# Patient Record
Sex: MALE | Race: WHITE | Employment: OTHER | URBAN - METROPOLITAN AREA
[De-identification: names, ages, dates, MRNs, and addresses within clinical notes are randomized per-mention and may not be internally consistent; named-entity substitution may affect disease eponyms.]

---

## 2020-01-10 ENCOUNTER — TRANSFERRED RECORDS (OUTPATIENT)
Dept: HEALTH INFORMATION MANAGEMENT | Facility: CLINIC | Age: 45
End: 2020-01-10

## 2020-01-10 ENCOUNTER — HOSPITAL ENCOUNTER (INPATIENT)
Facility: HOSPITAL | Age: 45
LOS: 4 days | Discharge: HOME | End: 2020-01-14
Attending: PSYCHIATRY & NEUROLOGY | Admitting: PSYCHIATRY & NEUROLOGY
Payer: MEDICAID

## 2020-01-10 DIAGNOSIS — F41.1 GENERALIZED ANXIETY DISORDER: Primary | ICD-10-CM

## 2020-01-10 PROBLEM — F33.1 MAJOR DEPRESSIVE DISORDER, RECURRENT EPISODE, MODERATE (H): Status: ACTIVE | Noted: 2020-01-10

## 2020-01-10 PROBLEM — F32.A DEPRESSION WITH SUICIDAL IDEATION: Status: ACTIVE | Noted: 2020-01-10

## 2020-01-10 PROBLEM — R45.851 DEPRESSION WITH SUICIDAL IDEATION: Status: ACTIVE | Noted: 2020-01-10

## 2020-01-10 LAB
ALT SERPL-CCNC: 44 IU/L (ref 6–40)
AST SERPL-CCNC: 39 IU/L (ref 10–40)
CREAT SERPL-MCNC: 0.86 MG/DL (ref 0.7–1.2)
GFR SERPL CREATININE-BSD FRML MDRD: >60 ML/MIN/1.73M*2
GLUCOSE SERPL-MCNC: 99 MG/DL (ref 70–99)
POTASSIUM SERPL-SCNC: 4.3 MEQ/L (ref 3.4–5.1)

## 2020-01-10 PROCEDURE — 12400000 ZZH R&B MH

## 2020-01-10 RX ORDER — OLANZAPINE 10 MG/2ML
10 INJECTION, POWDER, FOR SOLUTION INTRAMUSCULAR 2 TIMES DAILY PRN
Status: DISCONTINUED | OUTPATIENT
Start: 2020-01-10 | End: 2020-01-14 | Stop reason: HOSPADM

## 2020-01-10 RX ORDER — ALUMINA, MAGNESIA, AND SIMETHICONE 2400; 2400; 240 MG/30ML; MG/30ML; MG/30ML
30 SUSPENSION ORAL EVERY 4 HOURS PRN
Status: DISCONTINUED | OUTPATIENT
Start: 2020-01-10 | End: 2020-01-14 | Stop reason: HOSPADM

## 2020-01-10 RX ORDER — TRAZODONE HYDROCHLORIDE 50 MG/1
50 TABLET, FILM COATED ORAL
Status: DISCONTINUED | OUTPATIENT
Start: 2020-01-10 | End: 2020-01-14 | Stop reason: HOSPADM

## 2020-01-10 RX ORDER — ACETAMINOPHEN 325 MG/1
650 TABLET ORAL EVERY 4 HOURS PRN
Status: DISCONTINUED | OUTPATIENT
Start: 2020-01-10 | End: 2020-01-14 | Stop reason: HOSPADM

## 2020-01-10 RX ORDER — HYDROXYZINE HYDROCHLORIDE 25 MG/1
25-50 TABLET, FILM COATED ORAL EVERY 4 HOURS PRN
Status: DISCONTINUED | OUTPATIENT
Start: 2020-01-10 | End: 2020-01-14 | Stop reason: HOSPADM

## 2020-01-10 RX ORDER — OLANZAPINE 10 MG/1
10 TABLET ORAL 2 TIMES DAILY PRN
Status: DISCONTINUED | OUTPATIENT
Start: 2020-01-10 | End: 2020-01-14 | Stop reason: HOSPADM

## 2020-01-10 RX ORDER — LORAZEPAM 1 MG/1
1-2 TABLET ORAL 2 TIMES DAILY PRN
Status: DISCONTINUED | OUTPATIENT
Start: 2020-01-10 | End: 2020-01-14 | Stop reason: HOSPADM

## 2020-01-10 ASSESSMENT — ACTIVITIES OF DAILY LIVING (ADL)
HYGIENE/GROOMING: INDEPENDENT
TOILETING: 0-->INDEPENDENT
BATHING: 0-->INDEPENDENT
RETIRED_COMMUNICATION: 0-->UNDERSTANDS/COMMUNICATES WITHOUT DIFFICULTY
AMBULATION: 0-->INDEPENDENT
COGNITION: 0 - NO COGNITION ISSUES REPORTED
DRESS: 0-->INDEPENDENT
TRANSFERRING: 0-->INDEPENDENT
SWALLOWING: 0-->SWALLOWS FOODS/LIQUIDS WITHOUT DIFFICULTY
ORAL_HYGIENE: INDEPENDENT
RETIRED_EATING: 0-->INDEPENDENT
DRESS: SCRUBS (BEHAVIORAL HEALTH);INDEPENDENT
FALL_HISTORY_WITHIN_LAST_SIX_MONTHS: NO

## 2020-01-10 ASSESSMENT — MIFFLIN-ST. JEOR: SCORE: 1719.1

## 2020-01-10 NOTE — H&P
"[facility]    History and Physical  Medical Services       Date of Admission:  1/10/2020  Date of Service (when I saw the patient): 01/10/20    Assessment & Plan     Principal Problem:    Depression with suicidal ideation  Active Problems:    Alcohol use disorder, severe, in sustained remission (H)    Stimulant use disorder    Antisocial personality disorder (H)    Bipolar I disorder, most recent episode (or current) manic, moderate (H)    Cannabis use disorder, severe, in sustained remission (H)    Reflux esophagitis    Tobacco use disorder      Code Status: Full Code    Bessie Eason CNP    Primary Care Physician   Physician No Ref-Primary    Chief Complaint     \"suicidal\"     History obtained from chart.     History of Present Illness        Jeffry Mensah is a 44 year old with suicidal ideation with plan to hang self and increased depression. Stated \"I'm feeling suicidal. I'm not good\" \"I've been sad for a long time, they don't help you in [facility]\". Patient has a history of antisocial personality disorder, depression, adjustment disorder, and chemical dependency. The patient reported he just got out of [facility] on Monday 1/6/20 and has had a number of stressors in his life including being homeless. He reported he came from the [facility] planning to live with his brother although was not able to do so because his brother is involved in illegal activities. He also stated he feels very stressed by being on his own and trying to start his life without being involved in illegal activities or illegal drug use. He reported he had particular thoughts of hanging himself prompting him to come in for evaluation. In addition he reported one prior suicide attempt by cutting in the past and prior psychiatric hospitalizations. He denies any recent alcohol or illegal drug use. He does report he met with a county  to get some resources in place including a job and mental health follow-up although has " not had that appointment at this point. Stated trazodone helps, but does not take any other medications. Patient was transferred to [facility] and voluntarily admitted to Inpatient Behavioral Health for further assessment and stabilization.        Past Medical History    I have reviewed this patient's medical history and updated it with pertinent information if needed.   Past Medical History:   Diagnosis Date     Adjustment disorder with depressed mood 01/28/2015     Alcohol use disorder, severe, in sustained remission (H) 3/3/2014     Antisocial personality disorder (H) 2/26/2014     Antisocial personality disorder in adult (H)      Bipolar I disorder, most recent episode (or current) manic, moderate (H) 8/27/2015     Cannabis use disorder, severe, in sustained remission (H) 3/3/2014    Updated per 10/1/17 IMO import     Depression with suicidal ideation 1/10/2020     Hiatal hernia 02/14/2014     Insomnia 9/29/2009     Major depressive disorder, recurrent episode, moderate (H) 1/10/2020     Polysubstance dependence (H) 9/29/2009    Hx of using EtOH, Methamphetamine, marijuana, LSD plus 'others';  Last reported use Aug 7th, 09;  [facility].     Reflux esophagitis 02/14/2014     Stimulant use disorder 3/3/2014     Substance-induced psychotic disorder with hallucinations (H) 01/28/2015     Tobacco use disorder 8/27/2015       Past Surgical History   I have reviewed this patient's surgical history and updated it with pertinent information if needed.  No past surgical history on file.    Prior to Admission Medications   None     Allergies   Allergies   Allergen Reactions     Benadryl [Diphenhydramine] Swelling     Throat swells       Social History   I have reviewed this patient's social history and updated it with pertinent information if needed. Jeffry Mensah      Family History   I have reviewed this patient's family history and updated it with pertinent information if needed.    Family History   Problem Relation Age of Onset     Diabetes Father      Seizure Disorder Brother        Review of Systems   Review of systems not obtainable due to patient factors - pt refuses to discuss     Physical Exam   Temp: 97.3  F (36.3  C) Temp src: Tympanic BP: 128/80 Pulse: 112   Resp: 16 SpO2: 98 %      Vital Signs with Ranges  Temp:  [97.3  F (36.3  C)] 97.3  F (36.3  C)  Pulse:  [112] 112  Resp:  [16] 16  BP: (128)/(80) 128/80  SpO2:  [98 %] 98 %  200 lbs 3.2 oz        Constitutional: awake, NAD, appears well-nourished, well-developed  HEENT: atraumatic, normocephalic, PERRLA, bilateral external/internal ears normal, oral mucosa moist, no lymphadenopathy   Respiratory: CTA, no wheezes, no rales, no rhonchi, symmetric chest rise and fall  Cardiovascular: RRR, S1, S2, no extra heart sounds  Skin: warm, dry, intact, no open wounds  Neurologic: alert   Psychiatric: agitated        Data   Data reviewed today:   No lab results found in last 7 days.    No results found for this or any previous visit (from the past 24 hour(s)).

## 2020-01-10 NOTE — PROGRESS NOTES
01/10/20 1400   Patient Belongings   Did you bring any home meds/supplements to the hospital?  Yes   Disposition of meds  Sent to security/pharmacy per site process   Patient Belongings locker;sent to security per site process   Patient Belongings Put in Hospital Secure Location (Security or Locker, etc.) cash/credit card;cell phone/electronics;clothing;money (see comment);medication(s);glasses;shoes;tote;wallet;other (see comments)   Belongings Search Yes   Clothing Search Yes   Second Staff none   Comment black sandals, blue winter jacket, black lined zip up hoodie, black hoodie, orange knit hat, white reebok shoes, 2 white t-shirts, gray t-shirt, gray sweatshirt, gray sweatpants, maroon briefs, 2 pr white socks, khaki pants, gray cap, blue running pants, white handkerchief, brown tote type bag, wooden bird house, pink ceramic skull, 8 ball key chain, mini flashlight, tooth brush, toothpaste, pens and pencils, deodorant, bar soap in container,, sunglasses, headphones, cell phone adapter/, coffee grounds, blue duffle bag, blue jeans, white mesh laundry bag, black david pack, blue biefs, assorted paperwork, 1 quarter, IPod with blue rubber case, small iphone with sceen and cover cracks, LG flip phone, brown wallet, 9-$20 bills, 4-$1 bills, [place] card, [place] debit card, EBT card, 2 arden style tools (sent to security per weapon policy   List items sent to safe: IPod with blue rubber case, small iphone with sceen and cover cracks, LG flip phone, brown wallet, 9-$20 bills, 4-$1 bills, [place] card, [place] debit card, EBT card    2 arden style tools (sent to security per weapon policy)  All other belongings put in assigned cubby in belongings room.       I have reviewed my belongings list on admission and verify that it is correct.     Patient signature_______________________________    Second staff witness (if patient unable to sign) ______________________________       I have received all my  gia at discharge.    Patient signature________________________________    Tiffany ANDRES  1/10/2020  3:31 PM

## 2020-01-10 NOTE — PLAN OF CARE
"ADMISSION NOTE    Reason for admission SI.  Safety concerns recent incarceration.  Risk for or history of violence recent incarceration.   Full skin assessment: no open areas noted, no reddened skin noted.    Patient arrived on unit from [facility] in [place] accompanied by security staff and security transport on 1/10/2020  1:56 PM.     Status on arrival: guarded and anxious,  Highly aware of his surroundings.     /80 (BP Location: Right arm)   Pulse 112   Temp 97.3  F (36.3  C) (Tympanic)   Resp 16   Ht 1.626 m (5' 4\")   Wt 90.8 kg (200 lb 3.2 oz)   SpO2 98%   BMI 34.36 kg/m    Patient given tour of unit and Welcome to  unit papers given to patient, wanding completed, belongings inventoried, and admission assessment completed.   Patient's legal status on arrival is health officer hold. Appropriate legal rights discussed with and copy given to patient. Patient Bill of Rights discussed with and copy given to patient.   Patient denies SI, HI, and thoughts of self harm and contracts for safety while on unit.      Miley Hall RN  1/10/2020  2:28 PM    Patient arrived on unit, anxiety present in patient's movements and speech.  Very guarded with his choice of words and responses.  Asked patient if he was hungry and would like something to eat, patient responded with \"I don't need to eat\"    Head to toe skin assessment complete, no open or reddened areas noted.    Patient's anxiety level seems to have lessoned slightly once in his own room.  This writer attempted to do the admission on this patient \"hey I'm really anxious right now, I'll answer all and any questions you have once I feel better, now is just not a good time my anxiety is really bad\"    When asked if patient would like to talk about why he is here, patient responded \"I just don't want to talk about it right now\"    Patient in his room laying in bed at this time.          "

## 2020-01-10 NOTE — H&P
"[facility]    Psychiatric Evaluation/History & Physical    Patient Name: Jeffry Mensah   YOB: 1975  Age: 44 year old  [id number]    Primary Physician: No Ref-Primary, Physician   Completed By: STEVIE Fuller CNP     CC: \"felt overwhelmed\"         HPI:     Jeffry Mensah is a 44 year old with suicidal ideation with plan to hang self and increased depression. Stated \"I'm feeling suicidal. I'm not good\" \"I've been sad for a long time, they don't help you in [facility]\". Patient has a history of antisocial personality disorder, depression, adjustment disorder, and chemical dependency. The patient reported he just got out of snf on Monday 1/6/20 and has had a number of stressors in his life including being homeless. He reported he came from the [facility] planning to live with his brother although was not able to do so because his brother is involved in illegal activities. He also stated he feels very stressed by being on his own and trying to start his life without being involved in illegal activities or illegal drug use. He reported he had particular thoughts of hanging himself prompting him to come in for evaluation. In addition he reported one prior suicide attempt by cutting in the past and prior psychiatric hospitalizations. He denies any recent alcohol or illegal drug use. He does report he met with a county  to get some resources in place including a job and mental health follow-up although has not had that appointment at this point. Stated trazodone helps, but does not take any other medications. Patient was transferred to [facility] and voluntarily admitted to Inpatient Behavioral Health for further assessment and stabilization.    During initial interview on 1/10/20 with STEVIE present, patient presented as anxious and overwhelmed. He started interview then requested it be stopped and done at another time. Interview was ended.     Patient " "interviewed on 1/11/20, stated he has anxiety issues to point he will throw up or have diarrhea.  Stated his anxiety has been worse since release from skilled nursing as time is unstructured and \"people don't understand\".  Talked about having two brothers in [facility], one is \"sober\" and other \"is not good place, police come by\".  Describes feeling guilty asking for help and a burden.  Stated he had met with \"Jacquelin\" his SW and completed paperwork for \"food stamps, [employer] phone and help with a job\".  Then reported \"it was all too much and felt overwhelmed\".  Discussed recommendations for anxiety as therapy and antidepressant, reviewing several including Mirtazapine. Does not want to take scheduled medications, rather just PRN's. Stated trazodone helps with sleep and has taken Xanax and Vistaril in past for anxiety.  Willing to take hydroxyzine here and declined nicorette gum, would be interested in regular gum.  Would like to go to [facility], where he is on probation and closer to family. Denies hypomanic symptoms and stated he is more anxious than depressed.  Has only been sleeping 2-3 hours a night since 1/6/20 with exception of last night, slept 5-6 hours \"most in a while\".   Plans to attend groups while here.  Anxious about having a roommate and not feeling safe.     Patient provides information for this assessment. Intake data, records from previous hospitalizations and records from the Emergency Department were reviewed.          PMSPFH:     Past Medical History:  Past Medical History:   Diagnosis Date     Adjustment disorder with depressed mood 01/28/2015     Antisocial personality disorder in adult (H)      Hiatal hernia 02/14/2014     Reflux esophagitis 02/14/2014     Substance-induced psychotic disorder with hallucinations (H) 01/28/2015       Past Surgical History:  No past surgical history on file.  Past Psychiatric History:  Previous psychiatric diagnoses include: Adjustment Disorder, Major Depressive " "Disorder, Antisocial Personality Disorder, Bipolar Disorder, Alcohol Use Disorder, Methamphetamine Use Disorder, Cannabis Use Disorder, Polysubstance Abuse. He has had one previous psychiatric hospitalizations in 2012 when attempted to hang self. Denies engaging in self-injurious behavior. Patient is currently not seeing a psychiatrist. Has been taking trazodone as needed for sleep.  Previous psychotropic medication trials include: Wellbutrin XL, Gabapentin, Hydroxyzine, Risperdal, Trazodone. Effexor (vomiting)  Substance Use History:  History of significant drug use in forced remission since 2015 due to incarceration.  These include alcohol, methamphetamines and cannabis along with other polysubstances.  Patient does not endorse previous substance use disorder treatment.   Social History:  Patient has family in [facility]. He is the oldest of four siblings with three younger brothers. He is not . He currently is homeless. The patient was incarcerated for four years, stated this was for burglary.  He is on probation in [facility] with Elmira Joseph; [facility] Jacquelin. Has NA/AA sponsor who has been 11 years sober.     Family History:   Family History   Problem Relation Age of Onset     Diabetes Father      Seizure Disorder Brother      Home Medications:   No medications prior to admission.     Medical History and ROS:  No current outpatient medications on file.     Allergies not on file         Physical Exam:   Completed by Dr. Berry Hector on 1/10/20:  \"Constitutional:   General: He is not in acute distress.  Appearance: Normal appearance. He is not ill-appearing, toxic-appearing or diaphoretic.   HENT:   Head: Normocephalic and atraumatic.   Nose: Nose normal.   Mouth/Throat:   Mouth: Mucous membranes are moist.   Eyes:   Extraocular Movements: Extraocular movements intact.   Conjunctiva/sclera: Conjunctivae normal.   Pupils: Pupils are equal, round, and reactive to light.   Neck: " "  Musculoskeletal: Neck supple.   Cardiovascular:   Rate and Rhythm: Normal rate and regular rhythm.   Heart sounds: Normal heart sounds.   Pulmonary:   Effort: Pulmonary effort is normal.   Breath sounds: Normal breath sounds.   Abdominal:   General: Abdomen is flat. There is no distension.   Tenderness: There is no tenderness.   Skin:  General: Skin is warm and dry.   Neurological:   Mental Status: He is alert.   Psychiatric:   Mood and Affect: Mood is depressed.   Speech: Speech is rapid and pressured.   Thought Content: Thought content includes suicidal ideation. Thought content includes suicidal plan. \"    Essentially unchanged at presentation today.        Review of Systems:     Constitution: No weight loss, fever, night sweats  Respiratory: No cough or dyspnea  Cardiovascular:  No chest pain,  palpitations or fainting  Gastrointestinal:  No abdominal pain, nausea, vomiting or change in bowel habits  Genitourinary: Negative  Skin: No rashes, pruritus or open wounds  Neurological: No headaches or seizure activity.  Musculoskeletal: No muscle pain, joint pain or swelling   Psychiatric/Behavioral:  See HPI            Psychiatric Examination:   There were no vitals taken for this visit.  Appearance:  awake, alert, adequately groomed and dressed in hospital scrubs  Attitude:  somewhat cooperative  Eye Contact:  fair  Mood:  anxious  Affect:  mood congruent and intensity is heightened  Speech:  mumbling  Psychomotor Behavior:  fidgeting  Thought Process:  linear and goal oriented  Associations:  no loose associations  Thought Content:  no evidence of suicidal ideation or homicidal ideation and no evidence of psychotic thought  Insight:  limited  Judgment:  fair  Oriented to:  time, person, and place  Attention Span and Concentration:  limited  Recent and Remote Memory:  fair  Fund of Knowledge: appropriate  Muscle Strength and Tone: normal  Gait and Station: Normal          Labs: completed at [facility] on 1/10/20   COMPREHENSIVE METABOLIC PANEL   Collection Time: 01/10/20 3:32 AM   Result Value Ref Range   Sodium 137 134 - 143 mEq/L   Potassium 4.3 3.4 - 5.1 mEq/L   Chloride 106 99 - 110 mEq/L   Carbon Dioxide 23 19 - 29 mEq/L   Anion Gap 8.0 3.0 - 15.0 mEq/L   Blood Urea Nitrogen 15 5 - 24 mg/dL   Creatinine 0.86 0.70 - 1.20 mg/dL   Glomerular Filtration Rate >60 >60 mL/min/1.73 m*2   Calcium 9.4 8.4 - 10.5 mg/dL   Glucose 99 70 - 99 mg/dL   Protein, Total 7.2 6.0 - 8.0 g/dL   Albumin 4.1 3.5 - 5.0 g/dL   Alkaline Phosphatase 62 40 - 150 IU/L   Aspartate Aminotransferase 39 10 - 40 IU/L   Alanine Aminotransferase 44 (H) 6 - 40 IU/L   Bilirubin, Total 0.3 0.2 - 1.2 mg/dL   HEMOGRAM/DIFFERENTIAL   Collection Time: 01/10/20 3:32 AM   Result Value Ref Range   WBC 11.6 (H) 3.2 - 11.0 10*9/L   RBC 5.42 4.14 - 5.76 10*12/L   HGB 14.7 12.9 - 16.9 g/dL   HCT 45.0 38.4 - 49.7 %   MCV 83.0 81.4 - 99.0 fL   MCH 27.1 26.7 - 33.1 pg   MCHC 32.7 31.6 - 35.5 g/dL   RDW 13.7 11.3 - 14.6 %    130 - 375 10*9/L   Neutrophils % 50.7 %   Lymphocytes % 34.2 %   Monocytes % 8.3 %   Eosinophils % 5.8 %   Basophils % 0.7 %   Immature Granulocytes % 0.3 %   Neutrophils Absolute 5.9 1.5 - 7.6 10*9/L   Lymphocytes Absolute 4.0 (H) 0.8 - 3.3 10*9/L   Monocytes Absolute 1.0 (H) 0.2 - 0.9 10*9/L   Eosinophils Absolute 0.7 (H) 0.0 - 0.4 10*9/L   Basophils Absolute 0.1 0.0 - 0.1 10*9/L   Immature Granulocytes Absolute 0.03 0.00 - 0.06 10*9/L   ALCOHOL   Collection Time: 01/10/20 3:32 AM   Result Value Ref Range   Alcohol <10.0 <=10.0 mg/dL   URINE DRUG SCREEN   Collection Time: 01/10/20 3:28 AM   Result Value Ref Range   Urine Amphetamines Screen Negative Positive cut-off concentration: 1000 ng/mL   Urine Barbiturates Screen Negative Positive cut-off concentration: 200 ng/mL   Urine Benzodiazepines Screen Negative Positive cut-off concentration: 200 ng/mL   Urine Buprenorphine Screen Negative Positive cut-off concentration: 5  ng/mL   Urine Cocaine Screen Negative Positive cut-off concentration: 300 ng/mL   Urine Methadone Screen Negative Positive cut-off concentration: 300 ng/mL   Urine Opiates Screen Negative Positive cut-off concentration:300 ng/mL   Urine Oxycodone Screen Negative Positive cut-off concentration: 300 ng/mL   Urine Phencyclidine (PCP) Screen Negative Positive cut-off concentration: 25 ng/mL   Urine Carboxy-THC Screen Negative Positive cut-off concentration: 50 ng/mL       Assessment/Impression: Patient Jeffry Mensah is a 44 year old male admitted on 1/10/2020 with suicidal ideations.  Reports increased anxiety, dysregulated sleep and mood fluctuation since discharge from prison this week following four year incarceration. Patient declined scheduled medications for anxiety and mood. Discussed PRN's and reports benefits to hydroxyzine and trazodone but was very specific to not have scheduled and just as needed.  Will work on sleep hygiene, appetite, attending therapeutic groups and some time management. Consider transition to Crisis Center after discharge from hospital.  Educated regarding medication indications, risks, benefits, side effects, contraindications and possible interactions. Verbally expressed understanding.        DSM-V Diagnoses:   Major Depressive Disorder, recurrent, moderate  Generalized Anxiety Disorder  Problems Related to Release from Prison  Homelessness  H/O Antisocial Personality Disorder  H/O Alcohol Use Disorder, in sustained remission  H/O Cannabis Use Disorder, in sustained remission  H/O Methamphetamine Use Disorder, in sustained remission     Plan:  Admit to Unit: [facility]  Attending: STEVIE Fuller CNP  Patient is: Voluntary  Other routine labs were reviewed  Monitor for target symptoms: decreased suicidal ideation, decreased anxiety, improved sleep hygiene  Provide a safe environment and therapeutic milieu.   Re-Start/Start: Continue trazodone and melatonin as needed along with  hydroxyzine and ativan for short term due to extreme anxiety.  Consider transition to Crisis Center.     ELOS: 3-5 days for stabilization, start of medications, decrease in suicidal thoughts, dysregulated sleep and safe discharge plan    STEVIE Fuller CNP

## 2020-01-10 NOTE — PLAN OF CARE
Prior to Admission Medication Reconciliation:     Medications added:   [x] None  [] As listed below:    Medications deleted:   [] None  [x] As listed below: outside meds from 2016    wellbutrin 150 mg    pepcid 20 mg    Gabapentin 300 mg    Hydroxyzine 50 mg    Trazodone 100 mg- 2 tabs at bedtime- patient states taking but I can't find an active script at any pharmacies in [place]. Pt told me has hasn't picked up anything in awhile but got trazodone when he was incarcerated.     protonix 40 mg    risperdal 0.5 mg and 3 mg      Changes made to existing medications:   [x] None  [] As listed below:      Last times/dates taken verified with patient:  [] Yes- completed myself  [] Nurse completed no changes made  [x] Unable to review with patient:  [] Nurse completed/changes made:     Wanted to talk tomorrow    Allergy modifications:    [x]Did not review  []Patient verified NKA  []Patient verified current existing allergies: no changes made  []New allergies: listed below      Medication reconciliation sources:   [x]Patient  []Patient family member/emergency contact: **  [][facility] Report Review  []Epic Chart Review  []Care Everywhere review  []Pharmacy med list: **  [x]Pharmacy phone call: [facility], [facility], [facility], [facility], and [facility]  []Outside meds dispense report  []Nursing home MAR:  []Other: **    Is patient on coumadin?  [x]No    Time spent on medication reconciliation:   [x]5-20 mins  []20-40 mins  []> 40 mins    Discrepancies: [x] No  []Yes: listed below    Requests for consultation by provider or pharmacist:   [] Patient understands why all of their meds were prescribed and how to take them. No questions.   [] Fill dates coincide with compliancy for all maintenance meds.   [] Fill dates do not coincide with compliancy with maintenance meds. See notes in PTA medlist about how patient is taking.   [] Patient has questions about the following:    Comments: I tried to ask patient if there were any over  the counter meds he takes on a daily basis but he didn't want to talk about it. He states he only takes trazodone but I cannot find an active script.  [facility] had no records either.     Chela Barragan on 1/10/2020 at 3:37 PM     Issues completing PTA medication reconciliation:  [] On hold for a long time  [] Waited for a call back  [] Fax didn't come through  [] Fax took a long time  [] Other:

## 2020-01-11 PROBLEM — Z78.9 HISTORY OF INCARCERATION: Status: ACTIVE | Noted: 2020-01-11

## 2020-01-11 PROBLEM — F41.1 GENERALIZED ANXIETY DISORDER: Status: ACTIVE | Noted: 2020-01-11

## 2020-01-11 PROCEDURE — 99223 1ST HOSP IP/OBS HIGH 75: CPT | Performed by: NURSE PRACTITIONER

## 2020-01-11 PROCEDURE — 12400000 ZZH R&B MH

## 2020-01-11 PROCEDURE — 25000132 ZZH RX MED GY IP 250 OP 250 PS 637: Performed by: NURSE PRACTITIONER

## 2020-01-11 RX ADMIN — ACETAMINOPHEN, ASPIRIN AND CAFFEINE 2 TABLET: 250; 250; 65 TABLET, FILM COATED ORAL at 17:36

## 2020-01-11 ASSESSMENT — ACTIVITIES OF DAILY LIVING (ADL)
HYGIENE/GROOMING: INDEPENDENT
DRESS: SCRUBS (BEHAVIORAL HEALTH);INDEPENDENT
DRESS: INDEPENDENT;SCRUBS (BEHAVIORAL HEALTH)
HYGIENE/GROOMING: INDEPENDENT
LAUNDRY: UNABLE TO COMPLETE
ORAL_HYGIENE: INDEPENDENT

## 2020-01-11 NOTE — PLAN OF CARE
Problem: Adult Behavioral Health Plan of Care  Goal: Patient-Specific Goal (Individualization)  Description  Patient will be compliant with medication administration and treatment team recommendations.  Patient will remain independent with ADLs during hospitalization.   Patient will attend at least 50% of unit programming daily.   Patient will sleep 6-8 hours per night.    Outcome: No Change  Pt spent the first part of the shift in bed, resting. Tense and irritable on assessment. Minimally cooperative. Denied pain. Reported increased anxiety, but declined offer of PRN medication. Ate 100% of supper meal. Pt up on the unit later in the shift. More cooperative, though still irritable and easily agitated with too many questions.    Problem: Suicidal Behavior  Goal: Suicidal Behavior is Absent or Managed  Description  Patient will deny SI by discharge.   Patient will remain free from self harm/injury during hospitalization.    Outcome: No Change  Pt denied SI on assessment. Pt has remained free from self-harm and/or injury this shift.

## 2020-01-11 NOTE — PLAN OF CARE
"  Problem: Adult Behavioral Health Plan of Care  Goal: Patient-Specific Goal (Individualization)  Description  Patient will be compliant with medication administration and treatment team recommendations.  Patient will remain independent with ADLs during hospitalization.   Patient will attend at least 50% of unit programming daily.   Patient will sleep 6-8 hours per night.    1/11/2020 0853 by Shara Carrero, RN  Outcome: No Change    End of shift report received from previous shift RN. Pt declines breakfast, only accepted coffee. Pt to common areas. Pt denies pain. States he feels \"sad\" and \"I don't need any of your fancy terms\" when asked about depression. Pt denies SI/HI states \"I don't want to kill myself I just want to die in my sleep\". Pt states he wants to \"attend all meetings\". Pt dismissed writer stating he will find writer later to finish questions.   1000 - Pt attending groups. Dismissive of writer for assessment \"I just can't right now. I will find you later\"   1230 - Pt refuses to participate in assessment - abuse screening and suicide assessment declined.  Face to face end of shift report communicated to oncoming RN.   1410 - Pt endorses anxiety/overwhelmed at 10/10, declines medication intervention. States he's feeling anxious d/t paperwork he is reviewing.   1456 - Pt requesting Excedrin for \"headache\" when asked to rate the headache, pt rolls eyes, smiles, and states \"10\". Offered tylenol 650mg, pt declined. Educated on caffeine intake will increase feelings of anxiety and jitteriness plus elevate heartrate. Pt states \"maybe that's why I want it\". Pt had two cups of coffee in front of him. No medication administered at this time.    Shara Carrero, TAWANA  1/11/2020  1:29 PM        Problem: Suicidal Behavior  Goal: Suicidal Behavior is Absent or Managed  Description  Patient will deny SI by discharge.   Patient will remain free from self harm/injury during hospitalization.    1/11/2020 0853 by Magan, " TAWANA Olmedo  Outcome: No Change   Denies thoughts or plans of harm to himself.

## 2020-01-11 NOTE — PLAN OF CARE
Observed pt awake in his room while on bed checks - staff reported pt was naked on his bed and self soothing - he did not seem to notice staff doing checks - staff quietly shut the door and pt continued on unheeded.  It is now 0642 pt slept approx 5 hours was awake at 0130 and had a snack of sandwich - pudding and juice then returned to bed - was appropriate to staff and peer who is also in lounge - Face to face end of shift report communicated to onctea Lemon RN  1/11/2020  6:46 AM

## 2020-01-12 PROCEDURE — 99232 SBSQ HOSP IP/OBS MODERATE 35: CPT | Performed by: NURSE PRACTITIONER

## 2020-01-12 PROCEDURE — 25000132 ZZH RX MED GY IP 250 OP 250 PS 637: Performed by: NURSE PRACTITIONER

## 2020-01-12 PROCEDURE — 12400000 ZZH R&B MH

## 2020-01-12 RX ADMIN — ACETAMINOPHEN, ASPIRIN AND CAFFEINE 2 TABLET: 250; 250; 65 TABLET, FILM COATED ORAL at 10:01

## 2020-01-12 ASSESSMENT — ACTIVITIES OF DAILY LIVING (ADL)
ORAL_HYGIENE: INDEPENDENT
HYGIENE/GROOMING: INDEPENDENT
HYGIENE/GROOMING: INDEPENDENT
DRESS: SCRUBS (BEHAVIORAL HEALTH)
DRESS: INDEPENDENT;SCRUBS (BEHAVIORAL HEALTH)

## 2020-01-12 ASSESSMENT — MIFFLIN-ST. JEOR: SCORE: 1744.05

## 2020-01-12 NOTE — PLAN OF CARE
Observed pt lying in a prone position - eyes closed - non-labored  breathing continues. Slept well this noc - approx 7 hours - was up at 0600 and was watching television and having coffee - one pt had asked that he wait and he was a gentleman and obliged her - she is now sitting with him watching television.  Did request to see a  and that order was put in and main switchboard called and pt put on list.  He is now aware that he is on that list.  Has been pleasant and polite to staff and peers.

## 2020-01-12 NOTE — PLAN OF CARE
Problem: Adult Behavioral Health Plan of Care  Goal: Patient-Specific Goal (Individualization)  Description  Patient will be compliant with medication administration and treatment team recommendations.  Patient will remain independent with ADLs during hospitalization.   Patient will attend at least 50% of unit programming daily.   Patient will sleep 6-8 hours per night.    Pt on elopement precautions, noted loitering at doors and is hypervigilant of staff.   1/12/2020 0946 by Edwina Salazar, RN  Outcome: No Change  Note:   Shift Summery:  declines vitals this AM. Pain 5/10 described as HA from not having enough caffeine. Excedrine rec'd as requested.  Endorses Anxiety and depression and fleeting thoughts of SI-states he is overwhelmed with what he needs to get done: Rule 25, Arms worker set up, housing. Pt becomes visibly anxious when talking about tasks he needs to work on. Requests this writer take his paperwork and put it in his belongings because it is too overwhelming.  Sits in lounge most of shift. Attends groups-little interaction with peers.   Denies SIB, HI or hallucinations.   Appears anxious this afternoon. This writer asks if he wants to talk, walk or take prn medication-pt declines.       Edwina Salazar RN  1/12/2020  9:51 AM       Problem: Suicidal Behavior  Goal: Suicidal Behavior is Absent or Managed  Description  Patient will deny SI by discharge.   Patient will remain free from self harm/injury during hospitalization.    1/12/2020 0946 by Edwina Salazar, RN  Outcome: No Change    Face to face end of shift report communicated to oncoming shift.     Edwina Salazar RN  1/12/2020  9:51 AM

## 2020-01-12 NOTE — PLAN OF CARE
Face to face end of shift report communicated to oncoming RN.     Elham Tovar RN  1/11/2020  10:55 PM       Problem: Adult Behavioral Health Plan of Care  Goal: Patient-Specific Goal (Individualization)  Description  Patient will be compliant with medication administration and treatment team recommendations.  Patient will remain independent with ADLs during hospitalization.   Patient will attend at least 50% of unit programming daily.   Patient will sleep 6-8 hours per night.    Pt on elopement precautions, noted loitering at doors and is hypervigilant of staff.   1/11/2020 2250 by Elham Tovar, RN  Note:   Pt minimally participated in nursing assessment, signed voluntary rights this evening.  Pt is somewhat irritable, is sarcastic and critical of staff.  Noted to be hypervigilant and loitering at the doors.  Appears restless and focused on caffeine.  He refused evening vitals and medications, asking only for Excedrin, because it has caffeine in it.  Pt was lurking around the nurses station.  Only minimally interacts with peers.  He walked in and out of group.         Problem: Suicidal Behavior  Goal: Suicidal Behavior is Absent or Managed  Description  Patient will deny SI by discharge.   Patient will remain free from self harm/injury during hospitalization.    1/11/2020 2250 by Elham Tovar, RN  Note:   Pt did not harm self or others this evening shift.

## 2020-01-12 NOTE — PROGRESS NOTES
[facility]  Psychiatric Progress Note      Impression:   Patient Jeffry Mensah is a 44 year old male admitted on 1/10/2020 with suicidal ideation and plan to hang self.      Met with patient today. Stated he is anxious about having a roommate and does not feel he can take as needed medication for anxiety as concerned for personal safety if becomes sedated. Appetite is good and sleep is becoming more regulated with increased duration, charting stated around seven hours.  Continues to become overwhelmed when thinking about all the things he has to do like [employer], GA, etc.  Stated he has come up with plan to have  do some of this so he does not become anxious or overwhelmed. Was given a composition notebook to write down questions and lists so on paper and not constantly thinking about them. Stated he is doing that.  Continues to have passive suicidal thoughts. Has displayed appropriate behaviors with staff and peers with whom he is minimally interactive. Evening staff report patient presenting as critical of staff, restless, and hypervigilant. Attending some groups. Stated he feels it would be best for him to go from hospital to housing in [facility], not yet established. Discussed transition to Crisis Center, but becomes figity and using hands to talk while describing increased anxiety when thinking about this.  Agreeable to talk tomorrow, wanted reassurance he would be talking with this provider tomorrow.           Diagnoses:   Major Depressive Disorder, recurrent, moderate  Generalized Anxiety Disorder  Problems Related to Release from Prison  Homelessness  H/O Antisocial Personality Disorder  H/O Alcohol Use Disorder, in sustained remission  H/O Cannabis Use Disorder, in sustained remission  H/O Methamphetamine Use Disorder, in sustained remission    Attestation:  Patient has been seen and evaluated by me,  STEVIE Fuller CNP        Interim History:   The patient's care was  "not discussed with the treatment team as they do not meet on Sundays but chart notes were reviewed.    Behavioral Orders   Procedures     Code 1 - Restrict to Unit     Elopement precautions     Pt loiters near exit doors, noted to be hypervigilant of staff and unit activities.     Routine Programming     As clinically indicated     Self Injury Precaution     Status 15     Every 15 minutes.           Medications:     Current Facility-Administered Medications   Medication     acetaminophen (TYLENOL) tablet 650 mg     alum & mag hydroxide-simethicone (MYLANTA ES/MAALOX  ES) suspension 30 mL     aspirin-acetaminophen-caffeine (EXCEDRIN MIGRAINE) per tablet 2 tablet     hydrOXYzine (ATARAX) tablet 25-50 mg     LORazepam (ATIVAN) tablet 1-2 mg     magnesium hydroxide (MILK OF MAGNESIA) suspension 30 mL     melatonin tablet 1 mg     nicotine (NICORETTE) gum 2-4 mg     OLANZapine (zyPREXA) tablet 10 mg    Or     OLANZapine (zyPREXA) injection 10 mg     traZODone (DESYREL) tablet 50 mg      10 point ROS headache, reports from lights       Allergies:     Allergies   Allergen Reactions     Benadryl [Diphenhydramine] Swelling     Throat swells          Psychiatric Examination:   /78   Pulse 94   Temp 98  F (36.7  C) (Oral)   Resp 16   Ht 1.626 m (5' 4\")   Wt 93.3 kg (205 lb 11.2 oz)   SpO2 98%   BMI 35.31 kg/m    Weight is 205 lbs 11.2 oz  Body mass index is 35.31 kg/m .    Appearance:  awake, alert, adequately groomed and dressed in hospital scrubs  Attitude:  somewhat cooperative  Eye Contact:  fair  Mood:  anxious and sad   Affect:  mood congruent and intensity is heightened  Speech:  clear, coherent  Psychomotor Behavior:  fidgeting  Thought Process:  linear and goal oriented  Associations:  no loose associations  Thought Content:  no evidence of suicidal ideation or homicidal ideation and no evidence of psychotic thought  Insight:  limited  Judgment:  fair  Oriented to:  time, person, and place  Attention Span " and Concentration:  fair  Recent and Remote Memory:  limited  Fund of Knowledge: appropriate  Muscle Strength and Tone: normal  Gait and Station: Normal         Labs:   No results found for this or any previous visit (from the past 24 hour(s)).         Plan:   Continue Inpatient Hospitalization  Continue to provide a safe environment and therapeutic milieu.  Continue as needed medications  Reported benefit to Excedrin for headaches he stated are caused by lights on unit    ELOS: 2-3 days for stabilization, decrease in suicidal thoughts, dysregulated sleep and safe discharge plan

## 2020-01-13 PROCEDURE — 25000132 ZZH RX MED GY IP 250 OP 250 PS 637: Performed by: NURSE PRACTITIONER

## 2020-01-13 PROCEDURE — 12400000 ZZH R&B MH

## 2020-01-13 RX ADMIN — ALUMINUM HYDROXIDE, MAGNESIUM HYDROXIDE, AND DIMETHICONE 30 ML: 400; 400; 40 SUSPENSION ORAL at 23:51

## 2020-01-13 ASSESSMENT — ACTIVITIES OF DAILY LIVING (ADL)
DRESS: SCRUBS (BEHAVIORAL HEALTH)
HYGIENE/GROOMING: INDEPENDENT
ORAL_HYGIENE: INDEPENDENT
DRESS: INDEPENDENT;SCRUBS (BEHAVIORAL HEALTH)
HYGIENE/GROOMING: INDEPENDENT

## 2020-01-13 NOTE — PLAN OF CARE
"  Problem: Adult Behavioral Health Plan of Care  Goal: Patient-Specific Goal (Individualization)  Description  Patient will be compliant with medication administration and treatment team recommendations.  Patient will remain independent with ADLs during hospitalization.   Patient will attend at least 50% of unit programming daily.   Patient will sleep 6-8 hours per night.    Pt on elopement precautions, noted loitering at doors and is hypervigilant of staff.   1/13/2020 1003 by Edwina Salazar, RN  Outcome: No Change  Note:   Shift Summery: VSS,  Mood is irritable/tense --declines answering assessment questions or participating with plan of care today. States \"I want a copy of all my belongings ASAP-how much money do I have\". Inform pt this writer cannot print from his chart but I will writer everything down on paper and give to him this shift. Immediately give pt a list from items sent to safe and tell him this writer will write down clothing and toiletry items before the end of this shift (many items). Pt glares at this writer and makes derogatory comments about this writer when walking away. Pt visualized throwing personal belongings at wall in pt room via camera.   Pt given full list of belongings.   Does not attend any groups this shift.       Problem: Suicidal Behavior  Goal: Suicidal Behavior is Absent or Managed  Description  Patient will deny SI by discharge.   Patient will remain free from self harm/injury during hospitalization.    1/13/2020 1003 by Edwina Salazar, RN  Outcome: No Change    Face to face end of shift report communicated to oncoming shift.     Edwina Salazar RN  1/13/2020  10:11 AM           "

## 2020-01-13 NOTE — PROGRESS NOTES
"[facility]    Medical Services Progress Note    Date of Service (when I saw the patient): 01/13/2020    Assessment & Plan     Principal Problem:    Major depressive disorder, recurrent episode, moderate (H)    Active Medical Problems:    Reflux esophagitis- pt reports he does not take anything daily for GERD. Denies any trouble epigastric discomfort, burning in the chest, or regurgitation. Mylanta is ordered prn.         Depression with suicidal ideation    Alcohol use disorder, severe, in sustained remission (H)    Antisocial personality disorder (H)    Cannabis use disorder, severe, in sustained remission (H)    Generalized anxiety disorder    History of incarceration    Pt medically stable, no acute medical concerns. Chronic medical problems stable. Will sign off. Please consult for any new medical issues or concerns.     Code Status: Full Code.    Bessie Eason CNP    Interval History   Pt stated \"I am irritable and do not want to talk or be messed with\" When asked if I could assess him he stated \"no\". Pt appeared in NAD and was sitting in the common area in the corner alone.     -Data reviewed today: I reviewed all new labs and imaging results over the last 24 hours.     Physical Exam       BP: 116/72 Pulse: 97   Resp: 18 SpO2: 98 % O2 Device: None (Room air)    Vitals:    01/10/20 1356 01/12/20 0900   Weight: 90.8 kg (200 lb 3.2 oz) 93.3 kg (205 lb 11.2 oz)     Vital Signs with Ranges  Pulse:  [97] 97  Resp:  [14-18] 18  BP: (116-140)/(72-75) 116/72  SpO2:  [97 %-98 %] 98 %  No intake/output data recorded.    Constitutional: Appears well nourished, well developed, NAD      Medications       Data   No lab results found in last 7 days.    No results found for this or any previous visit (from the past 24 hour(s)).    "

## 2020-01-13 NOTE — PLAN OF CARE
"Attempted to meet with pt this morning for assessment, but pt was unable to answer questions stated \"I don't know\" to every question. Then stated \"I don't want to think about the past to answer that\" pt stated he was \"not in a good mood because I think someone stole my sponsor's kids headphones and \" Pt was irritable and had an intense affect. Continued to walk by nurses station windows and glares. Will attempt assessment later or will complete with available records in chart.   "

## 2020-01-13 NOTE — PLAN OF CARE
Radha Lemon RN  1/13/2020  6:53 AM      Observed pt lying in a supine position - eyes closed - non-labored breathing noted.  It is now 0650 and pt is in the [facility] area - did request and receive a snack of an egg salad sandwich - and juice - has been polite and appropriate with staff and peers. Face to face end of shift report communicated to oncoming RN.     Radha Lemon RN  1/13/2020  6:54 AM
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
## 2020-01-13 NOTE — PROGRESS NOTES
Patient refused to meet with provider to interview today. Staff report he has been angry and demanding with requests.  STEVIE Fuller CNP        Treatment Team Progress Note:   The patient's care was discussed with the treatment team and chart notes were reviewed.     BEHAVIORAL TEAM DISCUSSION     Progress: limited  Continued Stay Criteria/Rationale: agitation, dysregulated sleep, refusing to meet with providers, demanding of staff.    Medical/Physical: headaches stated from lights  Precautions:   Falls precaution?: No          Behavioral Orders   Procedures     Code 1 - Restrict to Unit     Elopement precautions       Pt loiters near exit doors, noted to be hypervigilant of staff and unit activities.     Routine Programming       As clinically indicated     Self Injury Precaution     Status 15       Every 15 minutes.      Plan: refusing medications; increased agitation and demands of staff; coordinate with  for discharge  Rationale for change in precautions or plan: none     Participants: Ashley Leger NP,  Ernestina Alex LICSW, Sandra Hong LSW,  Yolanda Swenson LSW, Jomar Negrete LSW, Keily [facility] [facility], Aviva Chavez OT, Munira Moncada OT, Susy Ramirez OT

## 2020-01-13 NOTE — PLAN OF CARE
Problem: Adult Behavioral Health Plan of Care  Goal: Patient-Specific Goal (Individualization)  Description  Patient will be compliant with medication administration and treatment team recommendations.  Patient will remain independent with ADLs during hospitalization.   Patient will attend at least 50% of unit programming daily.   Patient will sleep 6-8 hours per night.    Pt on elopement precautions, noted loitering at doors and is hypervigilant of staff.   1/12/2020 2120 by Elham Tovar, RN  Outcome: No Change    Pt's behavior remains labile, up on unit watching TV and only wandered in and out of group. When writer met to assess him, he stated he did not want to talk. Does not have scheduled medications and did not make request for PRN's this evening. Intermittently napped on shift and denied any physical complaint. Continued monitoring for further needs.  Problem: Suicidal Behavior  Goal: Suicidal Behavior is Absent or Managed  Description  Patient will deny SI by discharge.   Patient will remain free from self harm/injury during hospitalization.    1/12/2020 2120 by Elham Tovar, RN  Outcome: No Change    2330 - Face to face end of shift report to be communicated to oncoming shift RN.     Elham Tovar RN  1/12/2020  9:35 PM

## 2020-01-14 VITALS
HEIGHT: 64 IN | HEART RATE: 90 BPM | BODY MASS INDEX: 35.12 KG/M2 | OXYGEN SATURATION: 98 % | RESPIRATION RATE: 18 BRPM | TEMPERATURE: 98 F | WEIGHT: 205.7 LBS | SYSTOLIC BLOOD PRESSURE: 98 MMHG | DIASTOLIC BLOOD PRESSURE: 58 MMHG

## 2020-01-14 PROCEDURE — 99239 HOSP IP/OBS DSCHRG MGMT >30: CPT | Performed by: NURSE PRACTITIONER

## 2020-01-14 PROCEDURE — 25000132 ZZH RX MED GY IP 250 OP 250 PS 637: Performed by: NURSE PRACTITIONER

## 2020-01-14 RX ORDER — HYDROXYZINE PAMOATE 50 MG/1
50 CAPSULE ORAL 4 TIMES DAILY PRN
Qty: 120 CAPSULE | Refills: 0 | Status: SHIPPED | OUTPATIENT
Start: 2020-01-14

## 2020-01-14 RX ADMIN — HYDROXYZINE HYDROCHLORIDE 25 MG: 25 TABLET, FILM COATED ORAL at 09:00

## 2020-01-14 ASSESSMENT — ACTIVITIES OF DAILY LIVING (ADL)
ORAL_HYGIENE: INDEPENDENT
DRESS: SCRUBS (BEHAVIORAL HEALTH)
HYGIENE/GROOMING: INDEPENDENT

## 2020-01-14 NOTE — PLAN OF CARE
"  Problem: Adult Behavioral Health Plan of Care  Goal: Patient-Specific Goal (Individualization)  Description  Patient will be compliant with medication administration and treatment team recommendations.  Patient will remain independent with ADLs during hospitalization.   Patient will attend at least 50% of unit programming daily.   Patient will sleep 6-8 hours per night.    Pt on elopement precautions, noted loitering at doors and is hypervigilant of staff.   1/13/2020 2341 by Elham Tovar RN  Outcome: No Change  Pt has been up on the unit and interactive with peers this evening. Allowed VS and minimal assessment, pt is more focused on personal belongings that he thinks have been stolen by staff since being admitted. Writer went over list and reassured pt of items being in the security dept. Does easily become anxious, baseline is restless. Denied SI today, has made contact with his sponsor \"Hossein,\" and reports he was told he can discharge to sponsor's home as a safe plan. Denied physical complaints on shift, no behaviors of concern.  Problem: Suicidal Behavior  Goal: Suicidal Behavior is Absent or Managed  Description  Patient will deny SI by discharge.   Patient will remain free from self harm/injury during hospitalization.    1/13/2020 2341 by Elham Tovar, RN  Outcome: Improving  2330 - Face to face end of shift report communicated to oncoming shift RN.     Elham Tovar RN  1/13/2020  11:49 PM             "

## 2020-01-14 NOTE — DISCHARGE INSTRUCTIONS
Behavioral Discharge Planning and Instructions    Summary: Jeffry was admitted to  with increased mental health symptoms     Main Diagnosis: Major Depressive Disorder, recurrent, moderate, Generalized Anxiety Disorder, Problems Related to Release from senior living, Homelessness, H/O Antisocial Personality Disorder, H/O Alcohol Use Disorder, in sustained remission  H/O Cannabis Use Disorder, in sustained remission, H/O Methamphetamine Use Disorder, in sustained remission    Major Treatments, Procedures and Findings: Stabilize with medications, connect with community programs.    Symptoms to Report: feeling more aggressive, increased confusion, losing more sleep, mood getting worse or thoughts of suicide    Lifestyle Adjustment: Take all medications as prescribed, meet with doctor/ medication provider, out patient therapist, , and [facility] worker as scheduled. Abstain from alcohol or any unprescribed drugs.    Psychiatry Follow-up:     [facility]   DA- January 23rd @ 9am   [facility]- As arranged   [address],   [place] [address]   Phone: [phone]  Fax: [phone]    [facility]    - Jacquelin - [phone] - As arranged   - [facility]- As arranged   Phone: [phone]     Resources:   Crisis Intervention: 711.829.1141 or 048-934-7799 (TTY: 870.141.6641).  Call anytime for help.  [employer] [place] on Mental Illness (www.[place].qing.org): 277.696.3286 or 086-392-1391.  Alcoholics Anonymous (www.alcoholics-anonymous.org): Check your phone book for your local chapter.  Suicide Awareness Voices of Education (SAVE) (www.save.org): 870-981-ZDAZ (0546)  National Suicide Prevention Line (www.mentalhealthmn.org): 796-315-VPKZ (4707)  Mental Health Consumer/Survivor Network of [place] (www.mhcsn.net): 545.300.5816 or 679-094-3603  Mental Health Association of [place] (www.mentalhealth.org): 722.541.3532 or 169-325-4672    General Medication Instructions:   See your medication sheet(s) for  instructions.   Take all medicines as directed.  Make no changes unless your doctor suggests them.   Go to all your doctor visits.  Be sure to have all your required lab tests. This way, your medicines can be refilled on time.  Do not use any drugs not prescribed by your doctor.  Avoid alcohol.

## 2020-01-14 NOTE — DISCHARGE SUMMARY
"[facility]  Psychiatric Discharge Summary    Jeffry Mensah MRN# [id number]   Age: 44 year old YOB: 1975     Date of Admission:  1/10/2020  Date of Discharge:  1/14/2020  Admitting Physician:  Phi Barajas MD  Discharge Physician:  STEVIE Fuller CNP         Event Leading to Hospitalization and Hospital Stay   Jeffry Mensah is a 44 year old with suicidal ideation with plan to hang self and increased depression. Stated \"I'm feeling suicidal. I'm not good\" \"I've been sad for a long time, they don't help you in senior care\". Patient has a history of antisocial personality disorder, depression, adjustment disorder, and chemical dependency. The patient reported he just got out of senior care on Monday 1/6/20 and has had a number of stressors in his life including being homeless. He reported he came from the [facility] planning to live with his brother although was not able to do so because his brother is involved in illegal activities. He also stated he feels very stressed by being on his own and trying to start his life without being involved in illegal activities or illegal drug use. He reported he had particular thoughts of hanging himself prompting him to come in for evaluation. In addition he reported one prior suicide attempt by cutting in the past and prior psychiatric hospitalizations. He denies any recent alcohol or illegal drug use. He does report he met with a county  to get some resources in place including a job and mental health follow-up although has not had that appointment at this point. Stated trazodone helps, but does not take any other medications. Patient was transferred to [facility] and voluntarily admitted to Inpatient Behavioral Health for further assessment and stabilization.     During initial interview on 1/10/20 with STEVIE present, patient presented as anxious and overwhelmed. He started interview then requested it be stopped and done " "at another time. Interview was ended.      Patient interviewed on 1/11/20, stated he has anxiety issues to point he will throw up or have diarrhea.  Stated his anxiety has been worse since release from half-way as time is unstructured and \"people don't understand\".  Talked about having two brothers in [facility], one is \"sober\" and other \"is not good place, police come by\".  Describes feeling guilty asking for help and a burden.  Stated he had met with \"Jacquelin\" his SW and completed paperwork for \"food stamps, [employer] phone and help with a job\".  Then reported \"it was all too much and felt overwhelmed\".  Discussed recommendations for anxiety as therapy and antidepressant, reviewing several including Mirtazapine. Does not want to take scheduled medications, rather just PRN's. Stated trazodone helps with sleep and has taken Xanax and Vistaril in past for anxiety.  Willing to take hydroxyzine here and declined nicorette gum, would be interested in regular gum.  Would like to go to [facility], where he is on probation and closer to family. Denies hypomanic symptoms and stated he is more anxious than depressed.  Has only been sleeping 2-3 hours a night since 1/6/20 with exception of last night, slept 5-6 hours \"most in a while\".   Plans to attend groups while here.  Anxious about having a roommate and not feeling safe.     Hospital Course  During the course of hospitalization, patient refused to take medications. Stated he had benefits from Vistaril but was fearful he would become sedated and someone would take advantage of him.  Since release from incarceration, stated feeling \"overwhelmed\" with all the things he needed to complete with  and finding safe place to live.  He did participate in some groups and reported some benefits. Patient talked with AA/NA sponsor and can live with him in [facility] [place] which is in [facility] as he wanted.  Patient had improved sleep duration during his stay and " decrease in anxiety.  He denied suicidal ideation and requested discharge. Patient was discharged with medication Vistaril sent electronically to [facility] to be picked up and taken with patient.       At time of discharge, there is no evidence that patient is in immediate danger of self or others.        Diagnoses:   Major Depressive Disorder, recurrent, moderate  Generalized Anxiety Disorder  Problems Related to Release from Prison  Homelessness  H/O Antisocial Personality Disorder  H/O Alcohol Use Disorder, in sustained remission  H/O Cannabis Use Disorder, in sustained remission  H/O Methamphetamine Use Disorder, in sustained remission         Labs:   No results found for any visits on 01/10/20.         Discharge Medications:     Discharge Medication List as of 1/14/2020 11:08 AM      START taking these medications    Details   hydrOXYzine (VISTARIL) 50 MG capsule Take 1 capsule (50 mg) by mouth 4 times daily as needed for anxiety, Disp-120 capsule, R-0, E-Prescribe      melatonin 1 MG TABS tablet Take 1 tablet (1 mg) by mouth nightly as needed for sleep, Historical           Justification for dual anti-psychotic use: Not applicable  Patient is not on two different antipsychotics at time of discharge.         Psychiatric Examination:   Appearance:  awake, alert, adequately groomed and dressed in hospital scrubs  Attitude:  cooperative  Eye Contact:  good  Mood:  better  Affect:  mood congruent  Speech:  clear, coherent  Psychomotor Behavior:  no evidence of tardive dyskinesia, dystonia, or tics  Thought Process:  linear and goal oriented  Associations:  no loose associations  Thought Content:  no evidence of suicidal ideation or homicidal ideation, no auditory hallucinations present and no visual hallucinations present  Insight:  fair  Judgment:  fair  Oriented to:  time, person, and place  Attention Span and Concentration:  intact  Recent and Remote Memory:  intact  Fund of Knowledge: appropriate  Muscle Strength  and Tone: normal  Gait and Station: Normal         Discharge Plan:   [facility]   DA- January 23rd @ 9am   [facility]- As arranged   [address],   [place] [address]   Phone: [phone]  Fax: [phone]    [facility]    - Jacquelin - [phone] - As arranged   - [facility]- As arranged   Phone: [phone]        Discharge Services Provided:   > 30 minutes spent on discharge services, including:  Final examination of patient.  Review and discussion of Hospital stay.  Instructions for continued outpatient care/goals.  Preparation of discharge records.  Preparation of medications refills and new prescriptions.    Attestation:  The patient has been seen and evaluated by me,  STEVIE Fuller CNP

## 2020-01-14 NOTE — PLAN OF CARE
Discharge Note    Patient Discharged to pt living with AA sponsor at this time on 1/14/2020 12:50 PM via Taxi accompanied by staff walk pt to 's pharm then taxi.     Patient informed of discharge instructions in AVS. patient verbalizes understanding and denies having any questions pertaining to AVS. Patient stable at time of discharge. Patient denies SI, HI, and thoughts of self harm at time of discharge. All personal belongings returned to patient. Discharge prescriptions sent to [facility] via electronic communication. Psych evaluation, history and physical, AVS, and discharge summary faxed to next level of care- per SW.     Edwina Salazar, RN  1/14/2020  12:50 PM      Problem: Adult Behavioral Health Plan of Care  Goal: Patient-Specific Goal (Individualization)  Description  Patient will be compliant with medication administration and treatment team recommendations.  Patient will remain independent with ADLs during hospitalization.   Patient will attend at least 50% of unit programming daily.   Patient will sleep 6-8 hours per night.    Pt on elopement precautions, noted loitering at doors and is hypervigilant of staff.   1/14/2020 1009 by Edwina Salazar, RN  Outcome: Completed  Note:   Shift Summery:  VSS, denies pain. Denies all criteria including: SI, SIB, HI or hallucinations.endorses anxiety related to upcoming discharge-Atarax 25 mg requested-rec'd. Pt states he knows that he has to communicate better with his support people and be honest with them. Full range affect today. Laughing and smiling and joking with staff and peers. Behavior appropriate.       Problem: Adult Behavioral Health Plan of Care  Goal: Adheres to Safety Considerations for Self and Others  Outcome: Completed     Problem: Adult Behavioral Health Plan of Care  Goal: Optimized Coping Skills in Response to Life Stressors  Outcome: Completed     Problem: Adult Behavioral Health Plan of Care  Goal: Develops/Participates in  Therapeutic Mercedes to Support Successful Transition  Outcome: Completed     Problem: Adult Behavioral Health Plan of Care  Goal: Rounds/Family Conference  Outcome: Completed     Problem: Suicidal Behavior  Goal: Suicidal Behavior is Absent or Managed  Description  Patient will deny SI by discharge.   Patient will remain free from self harm/injury during hospitalization.    1/14/2020 1009 by Edwina Salazar RN  Outcome: Completed

## 2020-01-14 NOTE — PLAN OF CARE
Problem: Adult Behavioral Health Plan of Care  Face to face end of shift report received from Elham GILLIAM. Rounding completed. Patient observed.     Sarah Levy RN  1/13/2020    Problem: Adult Behavioral Health Plan of Care  Goal: Patient-Specific Goal (Individualization)  Description  Patient will be compliant with medication administration and treatment team recommendations.  Patient will remain independent with ADLs during hospitalization.   Patient will attend at least 50% of unit programming daily.   Patient will sleep 6-8 hours per night.    Pt on elopement precautions, noted loitering at doors and is hypervigilant of staff.   1/14/2020 0446 by Sarah Levy, RN  Outcome: No Change  Note:   Pt has been in bed with eyes closed and regular respirations x 4.5 hours this noc shift. 15 minute and PRN checks all night. No complaints offered. Will continue to monitor.        Face to face end of shift report to be communicated to oncoming RN.     Sarah Levy RN  1/14/2020

## 2023-01-16 ENCOUNTER — TELEPHONE (OUTPATIENT)
Dept: INTERNAL MEDICINE | Facility: CLINIC | Age: 48
End: 2023-01-16

## 2023-01-16 NOTE — TELEPHONE ENCOUNTER
"[facility] calling with patient to request PRN medications. Patient is requesting PRN prescriptions for nicotine gum, TUMS, famotidine and Tylenol 1000 mg. Patient stated he has not been seen by new PCP and is requesting a \"phone appointment\"     Routing to provider for review.     Can we leave a detailed message on this number? YES  Phone number patient can be reached at: Other phone number: TAWANA Balderas with [employer] [phone] Secured Line.     Justice L. Phoenix, RN  [facility] Triage      "

## 2023-01-16 NOTE — TELEPHONE ENCOUNTER
Spoke with TAWANA Balderas with [facility] to relay provider message. TAWANA Balderas verbalized understanding and will assist patient with scheduling OV with Celsa Ventura. TAWANA Balderas had no additional questions.

## 2023-01-16 NOTE — TELEPHONE ENCOUNTER
This patient has no showed 5+ consecutive appointments with providers in our system, including 3 with me. I have never met him. I require in-person visits for establish care. Per Care Everywhere, he did attend a visit on 01/06/2023 with Celsa Ventura MBBS in [place], [place]. I recommend his care is directed to that provider.